# Patient Record
Sex: FEMALE | Race: WHITE | Employment: PART TIME | URBAN - METROPOLITAN AREA
[De-identification: names, ages, dates, MRNs, and addresses within clinical notes are randomized per-mention and may not be internally consistent; named-entity substitution may affect disease eponyms.]

---

## 2018-06-22 ENCOUNTER — HOSPITAL ENCOUNTER (OUTPATIENT)
Dept: MAMMOGRAPHY | Age: 53
Discharge: HOME | End: 2018-06-22
Attending: FAMILY MEDICINE
Payer: OTHER GOVERNMENT

## 2018-06-22 DIAGNOSIS — Z12.39 SCREENING BREAST EXAMINATION: ICD-10-CM

## 2018-06-22 PROCEDURE — 77063 BREAST TOMOSYNTHESIS BI: CPT

## 2019-01-03 ENCOUNTER — HOSPITAL ENCOUNTER (OUTPATIENT)
Dept: MAMMOGRAPHY | Age: 54
Discharge: HOME | End: 2019-01-03
Attending: INTERNAL MEDICINE
Payer: OTHER GOVERNMENT

## 2019-01-03 DIAGNOSIS — K90.0 CELIAC DISEASE: ICD-10-CM

## 2019-01-03 DIAGNOSIS — Z13.820 ENCOUNTER FOR SCREENING FOR OSTEOPOROSIS: ICD-10-CM

## 2019-01-03 DIAGNOSIS — S22.49XS MULTIPLE FRACTURES OF RIBS, UNSPECIFIED SIDE, SEQUELA: ICD-10-CM

## 2019-01-03 PROCEDURE — 77080 DXA BONE DENSITY AXIAL: CPT

## 2019-06-24 ENCOUNTER — HOSPITAL ENCOUNTER (OUTPATIENT)
Dept: MAMMOGRAPHY | Age: 54
Discharge: HOME | End: 2019-06-24
Attending: FAMILY MEDICINE
Payer: OTHER GOVERNMENT

## 2019-06-24 DIAGNOSIS — Z12.39 BREAST SCREENING, UNSPECIFIED: ICD-10-CM

## 2019-06-24 PROCEDURE — 77063 BREAST TOMOSYNTHESIS BI: CPT

## 2020-12-09 ENCOUNTER — HOSPITAL ENCOUNTER (OUTPATIENT)
Dept: MAMMOGRAPHY | Age: 55
Discharge: HOME | End: 2020-12-09
Attending: FAMILY MEDICINE
Payer: OTHER GOVERNMENT

## 2020-12-09 DIAGNOSIS — Z12.39 BREAST CANCER SCREENING: ICD-10-CM

## 2020-12-09 PROCEDURE — 77063 BREAST TOMOSYNTHESIS BI: CPT

## 2022-06-20 ENCOUNTER — TRANSCRIBE ORDER (OUTPATIENT)
Dept: SCHEDULING | Age: 57
End: 2022-06-20

## 2022-06-20 DIAGNOSIS — Z12.31 SCREENING MAMMOGRAM FOR HIGH-RISK PATIENT: Primary | ICD-10-CM

## 2022-08-09 ENCOUNTER — HOSPITAL ENCOUNTER (OUTPATIENT)
Dept: MAMMOGRAPHY | Age: 57
Discharge: HOME | End: 2022-08-09
Attending: FAMILY MEDICINE
Payer: OTHER GOVERNMENT

## 2022-08-09 DIAGNOSIS — Z12.31 SCREENING MAMMOGRAM FOR HIGH-RISK PATIENT: ICD-10-CM

## 2022-08-09 PROCEDURE — 77063 BREAST TOMOSYNTHESIS BI: CPT

## 2022-08-25 ENCOUNTER — APPOINTMENT (OUTPATIENT)
Dept: GENERAL RADIOLOGY | Age: 57
End: 2022-08-25
Attending: EMERGENCY MEDICINE
Payer: OTHER GOVERNMENT

## 2022-08-25 ENCOUNTER — HOSPITAL ENCOUNTER (EMERGENCY)
Age: 57
Discharge: HOME | End: 2022-08-25
Attending: EMERGENCY MEDICINE
Payer: OTHER GOVERNMENT

## 2022-08-25 VITALS
RESPIRATION RATE: 16 BRPM | BODY MASS INDEX: 21.71 KG/M2 | OXYGEN SATURATION: 99 % | TEMPERATURE: 97.7 F | DIASTOLIC BLOOD PRESSURE: 68 MMHG | HEART RATE: 69 BPM | WEIGHT: 118 LBS | SYSTOLIC BLOOD PRESSURE: 104 MMHG | HEIGHT: 62 IN

## 2022-08-25 DIAGNOSIS — M25.532 LEFT WRIST PAIN: Primary | ICD-10-CM

## 2022-08-25 PROCEDURE — 74011250637 HC RX REV CODE- 250/637: Performed by: EMERGENCY MEDICINE

## 2022-08-25 PROCEDURE — 73080 X-RAY EXAM OF ELBOW: CPT

## 2022-08-25 PROCEDURE — 99283 EMERGENCY DEPT VISIT LOW MDM: CPT

## 2022-08-25 PROCEDURE — 73110 X-RAY EXAM OF WRIST: CPT

## 2022-08-25 RX ORDER — ESTRADIOL 0.05 MG/D
FILM, EXTENDED RELEASE TRANSDERMAL
COMMUNITY

## 2022-08-25 RX ORDER — ACETAMINOPHEN 500 MG
1000 TABLET ORAL
Status: COMPLETED | OUTPATIENT
Start: 2022-08-25 | End: 2022-08-25

## 2022-08-25 RX ADMIN — ACETAMINOPHEN 1000 MG: 500 TABLET ORAL at 18:25

## 2022-08-25 NOTE — ED PROVIDER NOTES
Jade Franz is a 60 y.o. female with PMhx of rosacea, celiac dz, migraine, GERD, who presents to ED with cc of 8/10 L wrist pain after injury PTA. Pt states she was walking and slipped on water and landed on her L arm. Denies head injury, LOC, blood thinner use, HA or neck pain. States she has some L elbow pain, however thinks this pain is originating in her wrist and radiates there whenever she moves it. Denies paresthesias. Denies yet taking any medicine for the pain. Denies wound. PMHx: Reviewed, as below. PSHx: Reviewed, as below. PCP: Sandro Boothe MD    There are no other complaints verbalized at this time. Past Medical History:   Diagnosis Date    Autoimmune disease ([address]     CELIAC DISEASE    Bowel obstruction ([address] 2005    (NO SURGERY)    GERD (gastroesophageal reflux disease)     Hormone replacement therapy (postmenopausal)     Estrogen patch for about 5 years. Menopause     LMP-2003    Migraine     Rosacea     Unspecified adverse effect of anesthesia 2003    FOR SEVERAL HOURS AFTER WAKING, COULDN'T TALK, COULDN'T TELL NURSE THAT SHE WAS IN PAIN; HYSTERECTOMY       Past Surgical History:   Procedure Laterality Date    ENDOSCOPY, COLON, DIAGNOSTIC  2003, 2009    HX BREAST BIOPSY Left     Left breast surgical biopsy about 30 years ago--cyst removed.     HX HYSTERECTOMY  2003    1 ovary remains    [place] ABDOMEN SURGERY PROC UNLISTED  X2    LAPAROSCOPY    FL BREAST SURGERY PROCEDURE UNLISTED Left     cysts (2) removed 1992           Family History:   Problem Relation Age of Onset    Cancer Mother     Heart Failure Brother         SECONDARY TO DRUG USE    Anesth Problems Neg Hx        Social History     Socioeconomic History    Marital status:      Spouse name: Not on file    Number of children: Not on file    Years of education: Not on file    Highest education level: Not on file   Occupational History    Not on file   Tobacco Use    Smoking status: Never    Smokeless tobacco: Never   Substance and Sexual Activity    Alcohol use: Yes     Alcohol/week: 1.0 standard drink     Types: 1 Glasses of wine per week     Comment: socially    Drug use: No    Sexual activity: Not on file   Other Topics Concern    Not on file   Social History Narrative    Not on file     Social Determinants of Health     Financial Resource Strain: Not on file   Food Insecurity: Not on file   Transportation Needs: Not on file   Physical Activity: Not on file   Stress: Not on file   Social Connections: Not on file   Intimate Partner Violence: Not on file   Housing Stability: Not on file         ALLERGIES: Other food; Garlic; Barley; Gluten; Legumes; Levaquin [levofloxacin]; Morphine; Peanut; Sulfa (sulfonamide antibiotics); Wheat; and Yeast, dried    Review of Systems   Musculoskeletal:  Positive for arthralgias. Negative for neck pain. Skin:  Negative for wound. Neurological:  Negative for syncope, numbness and headaches. All other systems reviewed and are negative. Vitals:    08/25/22 1811   BP: 104/68   Pulse: 69   Resp: 16   Temp: 97.7 °F (36.5 °C)   SpO2: 99%   Weight: 53.5 kg (118 lb)   Height: 5' 2\" (1.575 m)            Physical Exam  Vitals and nursing note reviewed. Constitutional:       Appearance: Normal appearance. She is not toxic-appearing or diaphoretic. HENT:      Head: Atraumatic. Right Ear: External ear normal.      Left Ear: External ear normal.   Eyes:      Conjunctiva/sclera: Conjunctivae normal.   Cardiovascular:      Rate and Rhythm: Normal rate. Pulmonary:      Effort: Pulmonary effort is normal. No respiratory distress. Abdominal:      General: There is no distension. Musculoskeletal:         General: No deformity. Cervical back: Normal range of motion. Comments: No ttp along L shoulder, L upper arm, L elbow. Good ROM noted. No bony tenderness along radius or ulna. 2+ radial pulse, cap refill < 2 seconds.  Radial, medial and ulnar nerve motor and sensory distributions intact. ROM wrist intact however noted to cause pain. Some tenderness along anatomically snuffbox. No noted break in the skin. Pt was asked to remove her rings at time of PE and she gave them to friend/family member who accompanied her. Skin:     General: Skin is warm and dry. Neurological:      Mental Status: She is alert and oriented to person, place, and time. Mental status is at baseline. MDM     Amount and/or Complexity of Data Reviewed  Tests in the radiology section of CPT®: ordered and reviewed  Discuss the patient with other providers: yes (Dr Melva Light, ED attending)           Procedures          VITAL SIGNS:  Vitals:    08/25/22 1811   BP: 104/68   Pulse: 69   Resp: 16   Temp: 97.7 °F (36.5 °C)   SpO2: 99%   Weight: 53.5 kg (118 lb)   Height: 5' 2\" (1.575 m)         LABS:  No results found for this or any previous visit (from the past 24 hour(s)). IMAGING:  XR WRIST LT AP/LAT/OBL MIN 3V   Final Result   No acute abnormality. XR ELBOW LT MIN 3 V   Final Result   No acute bony abnormality. Medications During Visit:  Medications   acetaminophen (TYLENOL) tablet 1,000 mg (1,000 mg Oral Given 8/25/22 1825)         DECISION MAKING:    Malena Hays is a 60 y.o. female who comes in as above. Sustained injury to her L arm. She is NVI distally. No evidence of open wound. X-rays negative. Given her pain and that she has some snuffbox tenderness, will place in thumb spica splint and have FU with orthopedics. Discussed supportive treatment during this time. I have discussed care with ED attending. Pt has been given close return precautions as well as follow up recommendations. Opportunity for questions presented. Pt verbalizes their understanding and agreement with care plan. IMPRESSION:  1.  Left wrist pain        DISPOSITION:  Discharged      Discharge Medication List as of 8/25/2022  7:26 PM           Follow-up Information       Follow up With Specialties Details Why Contact Info    [facility] Emergency Medicine Go to  As needed, If symptoms worsen [address] [id number] [phone]    Janis Eason(Maria Esther) Travon Zhou MD Orthopedic Surgery Schedule an appointment as soon as possible for a visit   [facility]  [address]  [phone]                The patient is asked to follow-up with their primary care provider and any other physicians as above. We have discussed strict return precautions and the patient is asked to return promptly for any increased concerns or worsening of symptoms and for return precautions regarding their symptoms today. They can return to this emergency department or any other emergency department. I have discussed with them results as above and presented opportunity for questions. They verbalize their understanding of the above and agreement with care plan. Please note that this dictation was completed with [employer], the computer voice recognition software. Quite often unanticipated grammatical, syntax, homophones, and other interpretive errors are inadvertently transcribed by the computer software. Please disregard these errors. Please excuse any errors that have escaped final proofreading.

## 2022-08-25 NOTE — DISCHARGE INSTRUCTIONS
Please treat with Tylenol and ibuprofen (Motrin) at home. You can alternate these every 3 hours: Tylenol - 3 hours - ibuprofen (Motrin) - 3 hours - Tylenol - etc. such that there are 6 hours between each dose of Tylenol and 6 hours between each dose of ibuprofen (Motrin). Max dose of Acetaminophen (Tylenol):  4 g per day from all sources. Please note most acetaminophen is dosed in mg. As such, 4000 mg per day is the maximum dosing per day. Please also note that some cold medications contain acetaminophen. Please do not take this high dosing for long periods of time as this can affect your liver. Max dose of ibuprofen (Advil, Aleve):  2400 mg per day from all sources. You can take either 600 mg four times daily or 800 mg three times daily. Please do not exceed this as it can be hard on your kidneys. Please take with a small amount of food if it causes stomach upset. Please do not take this high dosing for long periods of time as this can cause adverse effects, such as ulcers and GI bleeding. XR WRIST LT AP/LAT/OBL MIN 3V (Final result)  Result time 08/25/22 18:26:50  Final result by Pratibha Patel MD (08/25/22 18:26:50)                Impression:    No acute abnormality. Narrative:    EXAM: XR WRIST LT AP/LAT/OBL MIN 3V     INDICATION: fall. COMPARISON: None. FINDINGS: Four views of the left wrist demonstrate no fracture or other acute   osseous or articular abnormality. The soft tissues are within normal limits. XR ELBOW LT MIN 3 V (Final result)  Result time 08/25/22 18:42:02  Final result by Delilah Bain MD (08/25/22 18:42:02)                Impression:    No acute bony abnormality. Narrative:    EXAM: XR ELBOW LT MIN 3 V     INDICATION: fall. Left elbow pain     COMPARISON: None. FINDINGS: Three views of the left elbow demonstrate no fracture, dislocation,   effusion or other acute abnormality. Mild degenerative change.

## 2022-08-25 NOTE — ED TRIAGE NOTES
Patient arrives ambulatory to ed with complaints of left wrist pain around 1735. Patient states she was walking and slipped on water. Denies hitting head or LOC. Denies blood thinners. Has not taken anything for pain.

## 2022-08-26 ENCOUNTER — OFFICE VISIT (OUTPATIENT)
Dept: ORTHOPEDIC SURGERY | Age: 57
End: 2022-08-26
Payer: OTHER GOVERNMENT

## 2022-08-26 VITALS — WEIGHT: 118 LBS | BODY MASS INDEX: 20.91 KG/M2 | HEIGHT: 63 IN

## 2022-08-26 DIAGNOSIS — M25.522 LEFT ELBOW PAIN: ICD-10-CM

## 2022-08-26 DIAGNOSIS — S63.502A LEFT WRIST SPRAIN, INITIAL ENCOUNTER: ICD-10-CM

## 2022-08-26 DIAGNOSIS — M25.532 LEFT WRIST PAIN: Primary | ICD-10-CM

## 2022-08-26 PROCEDURE — 99203 OFFICE O/P NEW LOW 30 MIN: CPT | Performed by: PHYSICIAN ASSISTANT

## 2022-08-26 NOTE — PROGRESS NOTES
HPI: Shereen Mcginnis (: 1965) is a 60 y.o. female, patient, here for evaluation of the following chief complaint(s): Patient presents with complaint of left wrist and elbow pain. On 2022, she slipped on some water landing on her left side. She immediately had pain so she presented to the emergency department where she had x-rays of the left wrist and elbow which were unremarkable for fractures per report reviewed. She is currently in a thumb spica Velcro strap wrist splint and complaining the wrist is causing more pain. The brace does look a bit large for her. She denies numbness and tingling in the hand. No other complaints or concerns. She is accompanied by her . X-rays of the left elbow and wrist are available for review in PACS. Wrist Pain (Left) and Elbow Pain (Left)       Vitals:  Ht 5' 2.5\" (1.588 m)   Wt 118 lb (53.5 kg)   BMI 21.24 kg/m²    Body mass index is 21.24 kg/m². Allergies   Allergen Reactions    Other Food Other (comments)     RYE - DELIAC DISEASE  BLACK PEPPER - HEARTBURN, GI UPSET  TEA - CAUSES STOMACH UPSET    Garlic Other (comments)     AFFECTS EYES & SINUSES INSTANTLY    Barley Other (comments)     CELIAC DISEASE    Gluten Other (comments)     INTOLERANT; HAS CELIAC DISEASE    Legumes Other (comments)     INCLUDES PEANUTS - ALL-OVER FEELING OF DISCOMFORT, PALPITATIONS    Levaquin [Levofloxacin] Unknown (comments)    Morphine Unknown (comments)     POSSIBLE POST-OP REACTION INVOLVING INABILITY TO TALK FOR HOURS    Peanut Palpitations     DIZZINESS    Sulfa (Sulfonamide Antibiotics) Unknown (comments)    Wheat Other (comments)     CELIAC    Yeast, Dried Other (comments)     SEVERE STOMACH UPSET       Current Outpatient Medications   Medication Sig    estradioL (Minivelle) 0.05 mg/24 hr Minivelle 0.05 mg/24 hr transdermal patch    polyethylene glycol (MIRALAX) 17 gram packet Take 17 g by mouth daily.     fexofenadine (ALLEGRA) 30 mg tablet Take 60 mg by mouth daily. IBUPROFEN (MOTRIN PO) Take  by mouth as needed. No current facility-administered medications for this visit. Past Medical History:   Diagnosis Date    Autoimmune disease ([address]     CELIAC DISEASE    Bowel obstruction ([address] 2005    (NO SURGERY)    GERD (gastroesophageal reflux disease)     Hormone replacement therapy (postmenopausal)     Estrogen patch for about 5 years. Menopause     LMP-2003    Migraine     Rosacea     Unspecified adverse effect of anesthesia 2003    FOR SEVERAL HOURS AFTER WAKING, COULDN'T TALK, COULDN'T TELL NURSE THAT SHE WAS IN PAIN; HYSTERECTOMY        Past Surgical History:   Procedure Laterality Date    ENDOSCOPY, COLON, DIAGNOSTIC  2003, 2009    HX BREAST BIOPSY Left     Left breast surgical biopsy about 30 years ago--cyst removed. HX HYSTERECTOMY  2003    1 ovary remains    [place] ABDOMEN SURGERY PROC UNLISTED  X2    LAPAROSCOPY    [place] BREAST SURGERY PROCEDURE UNLISTED Left     cysts (2) removed 1992         Family History   Problem Relation Age of Onset    Cancer Mother     Heart Failure Brother         SECONDARY TO DRUG USE    Anesth Problems Neg Hx         Social History     Tobacco Use    Smoking status: Never    Smokeless tobacco: Never   Substance Use Topics    Alcohol use: Yes     Alcohol/week: 1.0 standard drink     Types: 1 Glasses of wine per week     Comment: socially    Drug use: No        Review of Systems    Constitutional: No fevers, chills, night sweats, excessive fatigue or weight loss. Musculoskeletal: + Left elbow and wrist pain. Neurologic: No headache, blurred vision, and no areas of focal weakness or numbness. Normal gait. No sensory problems. Respiratory: No dyspnea on exertion, orthopnea, chest pain, cough or hemoptysis.   Cardiovascular: No anginal chest pain, irregular heart beat, tachycardia, palpitations or orthopnea  Integumentary: No chronic rashes, inflammation, ulcerations, pruritus, petechiae, purpura, ecchymoses, or skin changes       Physical Exam    General: Alert, cooperative, no distress  Musculosketal: Left hand - Normal range of motion. Normal sensation. Patient is hesitant to actively flex the fingers as it is painful at the wrist.  With prompting, she can make a complete fist with ability to flex and extend the fingers without difficulty. No erythema, edema or warmth to the joints. Left wrist - Limited range of motion. Tenderness to palpation over the dorsal aspect of the left wrist with guarding. No swelling or ecchymosis appreciated. No angulation. No cysts. Left elbow - Normal range of motion. Normal sensation. No erythema, edema or warmth to the joint. No fluid collection. Negative hook test. No sign of triceps tendon disruption. Neurologic:  CNII-XII intact, Normal strength, sensation, and reflexes throughout    XR Results (most recent):  Results from Hospital Encounter encounter on 08/25/22    XR ELBOW LT MIN 3 V    Narrative  EXAM: XR ELBOW LT MIN 3 V    INDICATION: fall. Left elbow pain    COMPARISON: None. FINDINGS: Three views of the left elbow demonstrate no fracture, dislocation,  effusion or other acute abnormality. Mild degenerative change. Impression  No acute bony abnormality. XR WRIST LT AP/LAT/OBL MIN 3V     INDICATION: fall. COMPARISON: None. FINDINGS: Four views of the left wrist demonstrate no fracture or other acute  osseous or articular abnormality. The soft tissues are within normal limits. IMPRESSION  No acute abnormality. ASSESSMENT/PLAN:  Below is the assessment and plan developed based on review of pertinent history, physical exam, labs, studies, and medications. Left wrist and elbow pain. On 8/25/2022, she slipped on some water landing on her left side. She immediately had pain so she presented to the emergency department where she had x-rays of the left wrist and elbow which were unremarkable for fractures per report reviewed.   She is currently in a thumb spica Velcro strap wrist splint and complaining the wrist is causing more pain. The brace does look a bit large for her. Images and clinical exam are consistent with a left wrist sprain. She will transition to a classic Velcro strap wrist splint for comfort and support. In the meantime, she was placed in an ACE bandage for comfort and support. She was advised to engage in gentle range of motion at the elbow, wrist and hand as tolerated. She is currently taking ibuprofen for her pain and would like to continue with the ibuprofen. She will follow-up in the office in 3 to 4 weeks to review her progress. Return sooner as needed. 1. Left wrist pain  2. Left wrist sprain, initial encounter  -     [employer] BANDAGES  3. Left elbow pain    Return in about 3 weeks (around 9/16/2022). Dr. Emely Ring was available for immediate consult during this encounter. An electronic signature was used to authenticate this note.   -- Jhonatan Joshua PA-C

## 2023-10-12 ENCOUNTER — TRANSCRIBE ORDERS (OUTPATIENT)
Facility: HOSPITAL | Age: 58
End: 2023-10-12

## 2023-10-12 DIAGNOSIS — Z12.31 VISIT FOR SCREENING MAMMOGRAM: Primary | ICD-10-CM

## 2023-11-29 ENCOUNTER — HOSPITAL ENCOUNTER (OUTPATIENT)
Facility: HOSPITAL | Age: 58
Discharge: HOME | End: 2023-12-02
Payer: OTHER GOVERNMENT

## 2023-11-29 VITALS — HEIGHT: 62 IN | BODY MASS INDEX: 20.43 KG/M2 | WEIGHT: 111 LBS

## 2023-11-29 DIAGNOSIS — Z12.31 VISIT FOR SCREENING MAMMOGRAM: ICD-10-CM

## 2023-11-29 PROCEDURE — 77063 BREAST TOMOSYNTHESIS BI: CPT

## 2024-10-01 ENCOUNTER — HOSPITAL ENCOUNTER (OUTPATIENT)
Facility: HOSPITAL | Age: 59
Discharge: HOME | End: 2024-10-04
Attending: INTERNAL MEDICINE
Payer: OTHER GOVERNMENT

## 2024-10-01 DIAGNOSIS — Z13.820 SCREENING FOR OSTEOPOROSIS: ICD-10-CM

## 2024-10-01 PROCEDURE — 77080 DXA BONE DENSITY AXIAL: CPT

## 2024-12-02 ENCOUNTER — HOSPITAL ENCOUNTER (OUTPATIENT)
Facility: HOSPITAL | Age: 59
Discharge: HOME | End: 2024-12-05
Attending: INTERNAL MEDICINE
Payer: OTHER GOVERNMENT

## 2024-12-02 DIAGNOSIS — Z12.31 VISIT FOR SCREENING MAMMOGRAM: ICD-10-CM

## 2024-12-02 PROCEDURE — 77063 BREAST TOMOSYNTHESIS BI: CPT
